# Patient Record
Sex: MALE | Race: WHITE | Employment: OTHER | URBAN - METROPOLITAN AREA
[De-identification: names, ages, dates, MRNs, and addresses within clinical notes are randomized per-mention and may not be internally consistent; named-entity substitution may affect disease eponyms.]

---

## 2019-04-24 ENCOUNTER — OFFICE VISIT (OUTPATIENT)
Dept: ORTHOPEDIC SURGERY | Age: 44
End: 2019-04-24
Payer: COMMERCIAL

## 2019-04-24 VITALS — BODY MASS INDEX: 25.05 KG/M2 | WEIGHT: 175 LBS | HEIGHT: 70 IN

## 2019-04-24 DIAGNOSIS — M25.561 RIGHT KNEE PAIN, UNSPECIFIED CHRONICITY: Primary | ICD-10-CM

## 2019-04-24 PROCEDURE — 99203 OFFICE O/P NEW LOW 30 MIN: CPT | Performed by: ORTHOPAEDIC SURGERY

## 2019-04-24 NOTE — PROGRESS NOTES
MD Matty Bradley, [place]         Orthopaedic Surgery and Sports Medicine      Patient Name: Caridad Dias  YOB: 1975  Date of Encounter: 4/24/2019  Patient's PCP is Marquez Moore MD    SUBJECTIVE  Chief Complaint:  Knee Pain (RIGHT )      History of Present Illness:  Caridad Dias is a 41 y.o. male here regarding right knee pain. 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 The patient is currently ambulating without difficulty. .      The pain began several weeks ago. . There was not a history of injury. History of swelling: No  History of \"giving way\": No  History of instability: No  History of popping and/or catching: No    The pain is located medial.   The patient describes the symptoms as aching and sharp. He rates pain at 3/10. Symptoms improve with rest.   The symptoms are made worse with activity, stair climbing, kneeling. Sleep pattern is affected by the chief complaint: No    The patient has not had PT. The patient has not had an injection. The patient has not taken NSAIDs. The patient is working.       Pain Assessment:  Pain Assessment  Location of Pain: Knee  Location Modifiers: Right  Severity of Pain: 5  Quality of Pain: Dull, Aching  Duration of Pain: Persistent  Frequency of Pain: Intermittent  Aggravating Factors: Stairs, Walking, Standing, Squatting, Kneeling  Relieving Factors: Rest  Result of Injury: No  Work-Related Injury: No  Are there other pain locations you wish to document?: No    Past Medical History:  Past Medical History:   Diagnosis Date    Gastric ulcer        Past Surgical History:  Past Surgical History:   Procedure Laterality Date    ABDOMEN SURGERY      secondary to gastric ulcer       Allergies:  No Known Allergies    Medications:  Current Outpatient Medications   Medication Sig Dispense Refill    Omeprazole Magnesium (PRILOSEC OTC PO) Take  by mouth.  loratadine (CLARITIN) 10 MG tablet Take 10 mg by mouth daily. No current facility-administered medications for this visit. Review of Systems:  Shabnam Meza review of systems has been performed by intake and observation. All past and current ROS forms have been scanned into the medical record. She has been instructed to contact her primary care provider regarding ROS issues if not already being addressed at this time. There are no recent changes. The most recent ROS was scanned into media on 4/24/2019       OBJECTIVE  PHYSICAL EXAM  Vital Signs: There were no vitals filed for this visit. Body mass index is 25.11 kg/m². General Exam:   Constitutional: Patient is adequately groomed with no evidence of malnutrition  Mental Status: The patient is oriented to time, place and person. The patient's mood and affect are appropriate. Lymphatic: The lymphatic examination bilaterally reveals all areas to be without enlargement or induration. Vascular: Examination reveals no swelling or calf tenderness. Peripheral pulses are palpable and 2+. Neurological: The patient has good coordination. There is no weakness or sensory deficit. Right Knee Examination  Inspection:   Knee alignment: neutral  no swelling noted. No erythema or ecchymosis. Skin is intact with no cellulitis, rashes, ulcerations, lymphedema or cutaneous lesions noted. Gait: normal. The patient can bear weight on the extremity. Palpation: mild tenderness to palpation on the medial joint line. no effusion noted.     Range of Motion:  normal    Special Tests:  Lachman test: negative       Anterior drawer: negative       Posterior drawer: negative       Brayden's test: negative       Varus laxity at 30 degrees: negative       Valgus laxity at 30 degrees: negative    Strength: no gross motor weakness noted. Motor exam of the lower extremities show quadriceps, hamstrings, foot dorsiflexion and plantarflexion grossly intact. Neurologic & vascular: Sensation to both feet is grossly intact to light touch. The bilateral lower extremities are warm and well-perfused with brisk capillary refill. Additional Examinations:  Left Lower Extremity: Examination of the left lower extremity does not show any tenderness, deformity or injury. Range of motion is unremarkable. There is no gross instability. There are no rashes, ulcerations or lesions. Strength and tone are normal.      DIAGNOSTICS  Xrays obtained in office today: Yes  Xrays reviewed today: Yes  4 views of the right knee show   Fracture: No  Dislocation: No  Knee joint arthritis: none  Medial compartment: none  Lateral compartment: none  Patellofemoral compartment: none  Patellar tilt:No  Varus deformity: No  Valgus deformity:No           ASSESSMENT (Medical Decision Making)    Efraín Aguilera is a 41 y.o. male with the following diagnosis: right knee probable overuse symptoms. ICD-10-CM    1. Right knee pain, unspecified chronicity M25.561 XR KNEE RIGHT (MIN 4 VIEWS)       His overall course is unchanged despite conservative treatment      PLAN (Medical Decision Making)  Office Procedures:  Orders Placed This Encounter   Procedures    XR KNEE RIGHT (MIN 4 VIEWS)     Standing Status:   Future     Number of Occurrences:   1     Standing Expiration Date:   4/24/2020       Treatment Plan:    I discussed the diagnosis and treatment options with Efraín Aguilera today. Long discussion today about his progression of activity. It's probably a knee pain related to this new onset activity in a relatively rapid progression. He will continue varus knee sleeve when he runs. He'll continue to be very cautious with his shoewear. He is instructed to start oral anti-inflammatories over-the-counter such as Aleve.   Was informed that he must take that medication with food. I think it is okay for him to continue to run as long as his symptoms are minimal.  Patient was asked to follow-up in p.r.n. Non-steroidal anti-inflammatories medications (NSAIDs) can be used to assist with pain control and to reduce inflammatory changes. These medications may be over-the-counter or prescribed. We discussed taking the NSAID properly and the precautions. The patient understands that this medication may potentially interfere with other medications. Patient was also instructed to immediately discontinue the medication is there is any possible complication. Eben Velarde was instructed to call the office if his symptoms worsen or if new symptoms appear prior to the next scheduled visit. He is specifically instructed to contact the office between now and schedule appointment if he has concerns related to his condition or if he needs assistance in scheduling any above tests. He is welcome to call for an appointment sooner if he has any additional concerns or questions. This dictation was performed with a verbal recognition program (DRAGON) and it was checked for errors. It is possible that there are still dictated errors within this office note. If so, please bring any errors to my attention for an addendum. All efforts were made to ensure that this office note is accurate.

## 2021-06-06 ENCOUNTER — HOSPITAL ENCOUNTER (EMERGENCY)
Age: 46
Discharge: HOME | End: 2021-06-06
Payer: COMMERCIAL

## 2021-06-06 ENCOUNTER — APPOINTMENT (OUTPATIENT)
Dept: GENERAL RADIOLOGY | Age: 46
End: 2021-06-06
Payer: COMMERCIAL

## 2021-06-06 VITALS
SYSTOLIC BLOOD PRESSURE: 142 MMHG | RESPIRATION RATE: 16 BRPM | WEIGHT: 199 LBS | TEMPERATURE: 98.4 F | HEART RATE: 81 BPM | HEIGHT: 71 IN | OXYGEN SATURATION: 99 % | BODY MASS INDEX: 27.86 KG/M2 | DIASTOLIC BLOOD PRESSURE: 93 MMHG

## 2021-06-06 DIAGNOSIS — S93.402A SPRAIN OF LEFT ANKLE, UNSPECIFIED LIGAMENT, INITIAL ENCOUNTER: Primary | ICD-10-CM

## 2021-06-06 PROCEDURE — 99284 EMERGENCY DEPT VISIT MOD MDM: CPT

## 2021-06-06 PROCEDURE — 73610 X-RAY EXAM OF ANKLE: CPT

## 2021-06-06 PROCEDURE — 6370000000 HC RX 637 (ALT 250 FOR IP): Performed by: PHYSICIAN ASSISTANT

## 2021-06-06 PROCEDURE — 73590 X-RAY EXAM OF LOWER LEG: CPT

## 2021-06-06 RX ORDER — HYDROCODONE BITARTRATE AND ACETAMINOPHEN 5; 325 MG/1; MG/1
1 TABLET ORAL EVERY 6 HOURS PRN
Qty: 8 TABLET | Refills: 0 | Status: SHIPPED | OUTPATIENT
Start: 2021-06-06 | End: 2021-06-09

## 2021-06-06 RX ORDER — ESCITALOPRAM OXALATE 5 MG/1
5 TABLET ORAL DAILY
COMMUNITY

## 2021-06-06 RX ORDER — HYDROCODONE BITARTRATE AND ACETAMINOPHEN 5; 325 MG/1; MG/1
1 TABLET ORAL ONCE
Status: COMPLETED | OUTPATIENT
Start: 2021-06-06 | End: 2021-06-06

## 2021-06-06 RX ORDER — PANTOPRAZOLE SODIUM 40 MG/1
40 TABLET, DELAYED RELEASE ORAL DAILY
COMMUNITY

## 2021-06-06 RX ADMIN — HYDROCODONE BITARTRATE AND ACETAMINOPHEN 1 TABLET: 5; 325 TABLET ORAL at 19:36

## 2021-06-06 ASSESSMENT — PAIN SCALES - GENERAL
PAINLEVEL_OUTOF10: 7
PAINLEVEL_OUTOF10: 10
PAINLEVEL_OUTOF10: 9

## 2021-06-06 NOTE — ED NOTES
Left ankle pain injury. Pt stastes \"I stepped in a mole hole yesterday in my yard twisted my ankle,I was seen at [facility] this morning they did an xray said it was negative they placed my ankle in a air cast for support/I already have crutches to use also took some Ibuprofen earlier but it's just not helping with the pain and swelling\". Pt pedal pulse palpable.      Callie Salgado LPN  69/13/24 [id number]

## 2021-06-07 NOTE — ED NOTES
Pt scripts x1 instructed to follow up with [facility]. Assessed per Xu GARCIA.      Mariela Durand, ABDIRAHMAN  19/80/87 [id number]

## 2021-06-07 NOTE — ED PROVIDER NOTES
Not on file   Occupational History    Not on file   Tobacco Use    Smoking status: Passive Smoke Exposure - Never Smoker   Substance and Sexual Activity    Alcohol use: Yes     Comment: socially    Drug use: Not on file    Sexual activity: Not on file   Other Topics Concern    Not on file   Social History Narrative    Not on file     Social Determinants of Health     Financial Resource Strain:     Difficulty of Paying Living Expenses:    Food Insecurity:     Worried About Running Out of Food in the Last Year:     [address] in the Last Year:    Transportation Needs:     Lack of Transportation (Medical):  Lack of Transportation (Non-Medical):    Physical Activity:     Days of Exercise per Week:     Minutes of Exercise per Session:    Stress:     Feeling of Stress :    Social Connections:     Frequency of Communication with Friends and Family:     Frequency of Social Gatherings with Friends and Family:     Attends Oriental orthodox Services:     Active Member of Clubs or Organizations:     Attends Club or Organization Meetings:     Marital Status:    Intimate Partner Violence:     Fear of Current or Ex-Partner:     Emotionally Abused:     Physically Abused:     Sexually Abused:      No current facility-administered medications for this encounter. Current Outpatient Medications   Medication Sig Dispense Refill    escitalopram (LEXAPRO) 5 MG tablet Take 5 mg by mouth daily Pt unaware of correct dosage      pantoprazole (PROTONIX) 40 MG tablet Take 40 mg by mouth daily      HYDROcodone-acetaminophen (NORCO) 5-325 MG per tablet Take 1 tablet by mouth every 6 hours as needed for Pain for up to 3 days. Intended supply: 3 days. Take lowest dose possible to manage pain 8 tablet 0    loratadine (CLARITIN) 10 MG tablet Take 10 mg by mouth daily. No Known Allergies    REVIEW OF SYSTEMS:  6 systems reviewed, pertinent positives per HPI otherwise noted to be negative.     PHYSICAL EXAM:  BP [employer] ) 142/93   Pulse 81   Temp 98.4 °F (36.9 °C) (Oral)   Resp 16   Ht 5' 11\" (1.803 m)   Wt 199 lb (90.3 kg)   SpO2 99%   BMI 27.75 kg/m²   CONSTITUTIONAL: Awake and alert. Well-developed. Well-nourished. Non-toxic. Cooperative. No acute distress. HENT: Normocephalic. Atraumatic. External ears normal, without discharge. Nose normal. Mucous membranes moist.  EYES: Conjunctiva non-injected. No scleral icterus. PERRL. EOM's grossly intact. NECK: Supple. Normal ROM. CARDIOVASCULAR: Normal heart rate. Intact distal pulses. PULMONARY/CHEST WALL: Breathing is unlabored. Equal, symmetric chest rise. Speaking comfortably in full sentences. ABDOMEN: Nondistended  MUSKULOSKELETAL: Left lower extremity: No acute deformities. Mild swelling to the lateral malleolus. Tenderness to the lateral malleolus and mild tenderness to the proximal fibula. No crepitus. Good ROM. No pain or defect over the Achilles tendon. SKIN: Warm and dry. NEUROLOGICAL: Alert and oriented x 3. Strength is 5/5 in all extremities and sensation is intact. PSYCHIATRIC: Normal affect    Labs:    None    RADIOLOGY:    All x-ray studies are viewed/reviewed by me. Formal interpretations per the radiologist are as follows:    XR TIBIA FIBULA LEFT (2 VIEWS)    Result Date: 6/6/2021  EXAMINATION: 2 XRAY VIEWS OF THE LEFT TIBIA AND FIBULA 6/6/2021 7:41 pm COMPARISON: Left ankle x-ray same day. HISTORY: ORDERING SYSTEM PROVIDED HISTORY: Describes twisting left ankle. Pain to palpate proximal fibula TECHNOLOGIST PROVIDED HISTORY: Reason for exam:->Describes twisting left ankle. Pain to palpate proximal fibula Reason for Exam: left lower leg pain, ankle xray negative Acuity: Acute Type of Exam: Initial FINDINGS: Distal tibia and fibula at the ankle are better evaluated on same-day ankle x-ray. No acute fracture in the remainder of the fibula and tibia. Suboptimal evaluation for dislocation at the knee due to projection. Ankle effusion noted. Distal tibia and fibula at the ankle are better evaluated on same-day ankle x-ray. No acute fracture in the remainder of the fibula and tibia. Nonspecific ankle effusion. Correlate with presentation. XR ANKLE LEFT (MIN 3 VIEWS)    Result Date: 6/6/2021  EXAMINATION: THREE XRAY VIEWS OF THE LEFT ANKLE 6/6/2021 7:05 pm COMPARISON: None. HISTORY: ORDERING SYSTEM PROVIDED HISTORY: injury TECHNOLOGIST PROVIDED HISTORY: Reason for exam:->injury FINDINGS: The soft tissues are unremarkable. There is no fracture or dislocation. No focal destructive osseous lesion. No radiopaque foreign body is identified. There is a small plantar calcaneal spur. No acute abnormality           ED COURSE/MDM:  Patient was given the following medications:  Medications   HYDROcodone-acetaminophen (NORCO) 5-325 MG per tablet 1 tablet (1 tablet Oral Given 6/6/21 1936)       I have evaluated this patient here in the ED. Patient describes an inversion injury to his left ankle yesterday. He has had pain since then. He describes constant throbbing. Though he had an outpatient x-ray at urgent care before coming here he is here because of continued pain. An x-ray of the left ankle joint shows no acute abnormality. Further x-rays of the left tib-fib are done secondary to some mild proximal fibula pain. This is negative for fracture. There is a nonspecific ankle effusion which correlates with his acute ankle sprain. Again I recommended ice, elevate, use of crutches. He can continue wearing the air splint as provided by urgent care. In addition to ibuprofen regularly I am going to write a very short course of Norco and have him follow-up with orthopedics due to his inability to tolerate injury with conservative measures at this time. Patient was given scripts for the following medications. I counseled patient how to take these medications.    New Prescriptions    HYDROCODONE-ACETAMINOPHEN (NORCO) 5-325 MG PER TABLET    Take 1

## 2023-03-14 ENCOUNTER — OFFICE VISIT (OUTPATIENT)
Dept: ENT CLINIC | Age: 48
End: 2023-03-14
Payer: COMMERCIAL

## 2023-03-14 VITALS — WEIGHT: 190 LBS | TEMPERATURE: 97.2 F | BODY MASS INDEX: 26.6 KG/M2 | HEIGHT: 71 IN | RESPIRATION RATE: 16 BRPM

## 2023-03-14 DIAGNOSIS — J34.2 DEVIATED NASAL SEPTUM: Primary | ICD-10-CM

## 2023-03-14 DIAGNOSIS — J30.2 SEASONAL ALLERGIES: ICD-10-CM

## 2023-03-14 PROCEDURE — 99204 OFFICE O/P NEW MOD 45 MIN: CPT | Performed by: STUDENT IN AN ORGANIZED HEALTH CARE EDUCATION/TRAINING PROGRAM

## 2023-03-14 ASSESSMENT — ENCOUNTER SYMPTOMS
COUGH: 0
NAUSEA: 0
EYE PAIN: 0
RHINORRHEA: 0
SHORTNESS OF BREATH: 0
VOMITING: 0

## 2023-03-14 NOTE — PROGRESS NOTES
[facility]      Patient Name: Abelardo Raymundo  Medical Record Number:  [id number]  Primary Care Physician:  Priscilla Tobias PA-C  Date of Consultation: 3/14/2023    Chief Complaint:   Chief Complaint   Patient presents with    New Patient     Patient states he thinks he has a deviated septum. Was referred by allergist      HISTORY OF PRESENT ILLNESS  Tess Bess is a(n) 41 y.o. male who presents for evaluation of nasal congestion. He states is been going on for many years. He believes he is not able to breathe well out of his nose about 70 to 80% of the time. He is currently on allergy shots and has not maintenance therapy. He has tried Flonase and is currently on Flonase. He still continues to have difficulty breathing the older he gets. Is not had any nose or sinus surgeries. He does not have any history of head and neck imaging. There is no problem list on file for this patient. Past Surgical History:   Procedure Laterality Date    ABDOMEN SURGERY      secondary to gastric ulcer    TONSILLECTOMY       No family history on file.   Social History     Socioeconomic History    Marital status:      Spouse name: Not on file    Number of children: Not on file    Years of education: Not on file    Highest education level: Not on file   Occupational History    Not on file   Tobacco Use    Smoking status: Never     Passive exposure: Yes    Smokeless tobacco: Not on file   Vaping Use    Vaping Use: Never used   Substance and Sexual Activity    Alcohol use: Not Currently     Comment: socially    Drug use: Never    Sexual activity: Not on file   Other Topics Concern    Not on file   Social History Narrative    Not on file     Social Determinants of Health     Financial Resource Strain: Not on file   Food Insecurity: Not on file   Transportation Needs: Not on file   Physical Activity: Not on file   Stress: Not on file   Social Connections: Not on file   Intimate Partner Violence: Not on file   Housing Stability: Not on file       DRUG/FOOD ALLERGIES: Patient has no known allergies. CURRENT MEDICATIONS  Prior to Admission medications    Medication Sig Start Date End Date Taking? Authorizing Provider   escitalopram (LEXAPRO) 5 MG tablet Take 5 mg by mouth daily Pt unaware of correct dosage   Yes Historical Provider, MD   pantoprazole (PROTONIX) 40 MG tablet Take 40 mg by mouth daily   Yes Historical Provider, MD   loratadine (CLARITIN) 10 MG tablet Take 10 mg by mouth daily. Yes Historical Provider, MD     REVIEW OF SYSTEMS  The following systems were reviewed and revealed the following in addition to any already discussed in the HPI:    Review of Systems   Constitutional:  Negative for fatigue and fever. HENT:  Positive for congestion. Negative for ear pain, postnasal drip, rhinorrhea and sneezing. Eyes:  Negative for pain and visual disturbance. Respiratory:  Negative for cough and shortness of breath. Cardiovascular:  Negative for chest pain. Gastrointestinal:  Negative for nausea and vomiting. Endocrine: Negative. Genitourinary: Negative. Musculoskeletal:  Negative for neck pain and neck stiffness. Skin:  Negative for rash. Neurological:  Negative for dizziness and headaches.       PHYSICAL EXAM  Temp 97.2 °F (36.2 °C) (Infrared)   Resp 16   Ht 5' 11\" (1.803 m)   Wt 190 lb (86.2 kg)   BMI 26.50 kg/m²     GENERAL: No Acute Distress, Alert and Oriented, no hoarseness  EYES: EOMI, Anti-icteric  NOSE: No epistaxis, nasal mucosa within normal limits, no purulent drainage, S-shaped deviated septum to the left anteriorly to the right posteriorly with contact inferior turbinates bilaterally  EARS: Normal external canal appearance, EAC patent bilaterally, TMs intact bilaterally no evidence of effusions  FACE: 1/6 House-Brackmann Scale, symmetric, sensation equal bilaterally  ORAL CAVITY: No masses or lesions palpated, uvula is midline, moist mucous membranes,   NECK: Normal range of motion, no thyromegaly, trachea is midline, no lymphadenopathy, no neck masses, no crepitus  CHEST: Normal respiratory effort, no retractions, breathing comfortably  SKIN: No rashes, normal appearing skin, no evidence of skin lesions/tumors    RADIOLOGY  Summary of findings:    PROCEDURE    ASSESSMENT/PLAN  Robyn Shields is a very pleasant 41 y.o. male with     1. Deviated nasal septum  Robyn Shields has a S-shaped deviated septum and has failed Flonase and is currently on allergy shots. He is a candidate for septoplasty with inferior turbinate reduction. Risk benefits alternatives surgery were discussed with patient. Risk include but not limited to postoperative hemorrhage, septal perforation, infection, and anesthesia risk of general anesthesia. He is amenable to these risks of surgery and we will schedule surgery at his earliest convenience. Postoperative instructions were placed in the patient's chart for education purposes    2. Seasonal allergies  He should continue using the Flonase and allergy shots    Follow-up 1 week after surgery    Medical Decision Making:   The following items were considered in medical decision making:  Independent review of images  Review / order clinical lab tests  Review / order radiology tests  Decision to obtain old records

## 2023-03-14 NOTE — PATIENT INSTRUCTIONS
SEPTOPLASTY / [facility] Operative Instructions   [facility] Number (24 hours): [phone]    The Surgery Itself   Septoplasty and turbinate reduction involves general anesthesia, typically for less than one hour. Patients may be sedated for several hours after surgery and may remain sleepy for the better part of the day. Nausea and vomiting is occasionally seen, and usually resolves by the evening of surgery - even without additional medications. Almost all patients can go home the day of surgery. After Surgery  You may have plastic splints and/or gauze in your nose following surgery; this will make breathing through your nose difficult. A humidifier or vaporizer can be used in the bedroom to prevent throat pain with mouth breathing. Bloody nasal drainage is normal after this surgery for 5-7 days, usually decreasing in volume with each day that passes. Drainage will flow from the front of the nose and down the back of the throat. Make sure you spit out blood drainage that drips down the back of your throat to prevent nausea/vomiting. You will have a nasal drip pad/sling with gauze to catch drainage from the front of your nose. The dressing may need to be changed frequently during the first 24 hours following surgery. In case of profuse nasal bleeding, you may apply ice to the bridge of the nose and pinch the nose just above the tip and hold for 10 minutes; if profuse bleeding continues, contact the doctor's office. You may notice facial pressure and fullness similar to a mild sinus infection. This is more common if splints or gauze are in place. Frequent hot showers, breathing in steam from a pot of boiling water, or simply sniffing a small amount of water through your nose will help break up congestion and clear any clot or mucus that builds up within the nose after surgery. Do not pull at the splints, gauze or sutures in your nose after surgery.     It is more comfortable to sleep with extra pillows or in a recliner for the first few days after surgery until the drainage begins to resolve. Do not blow your nose for 2 weeks after surgery. Avoid lifting > 10 lbs. and no vigorous exercise for 2 weeks after surgery. Sense of smell and taste are often diminished for several weeks after surgery. There may be some tenderness or numbness in your upper front teeth, which is normal after surgery. You may express old clot, discolored mucus or very large nasal crusts from your nose for up to 3-4 weeks after surgery; depending on how frequently and how effectively you irrigate your nose with the saltwater spray. Medications   Pain medication should be used for pain as prescribed. Pain and pressure in the nose is expected after surgery - the nasal splints cause most of this. As the surgical site heals, pain will resolve over the course of a week. Pain medications can cause nausea, which can be prevented if you take them with food or milk. You can purchase 2 nasal sprays for use after surgery:   Afrin can be used up to 2 times a day (best before bed) to reduce bloody drainage from the nose for the first few days after surgery. DO NOT USE FOR MORE THAN 3 DAYS  Saline/salt water spray can be used once the nasal splints/gauze are removed to prevent crusting inside of the nose. Take all of your routine medications as prescribed, unless told otherwise by your doctor. Any medications that thin the blood should be avoided.  (Examples: Motrin, Aspirin, Advil)

## 2023-03-14 NOTE — LETTER
[facility]    Surgery Schedule Request Form: 03/16/23  [address], [place]  DATE OF SURGERY: 3/29/23  TIME OF SURGERY:  10:00            CONF #: ____________________   Patient Information:  Patient name: Marlon Ortiz    YOB: 1975 Age/Sex:47 y.o./male    SS #:[id number]    Wt Readings from Last 1 Encounters:   03/14/23 190 lb (86.2 kg)       Telephone Information:   Mobile [phone]     Home [phone]     Surgeon & Procedure Information:   Lead surgeon: Chilango Monreal Co-Surgeon: chance  Phone: [phone] Fax: [phone]  PCP: Dejan Roman PA-C    Diagnosis: deviated nasal septum, J34.2   inferior turbinate hypertrophy  J30.2      Location: Any    Procedure name/CPT: Bilateral inferior turbinate reduction 00822-68 and Septoplasty [id number]    Procedure length: 1 hour Anesthesia: General    Special Equipment: no    Patient Status: SDS (OP)    COVID Vacs: yes / no     Primary Payor Plan: BC/BS  Member ID: [id number]   [address] [place] [address] Drive name: Elva Roth    [] Implement attached clinical orders for patient.       Electronically signed by Jose Lim DO on 3/16/2023 at 9:26 AM

## 2023-03-16 ENCOUNTER — TELEPHONE (OUTPATIENT)
Dept: ENT CLINIC | Age: 48
End: 2023-03-16

## 2023-03-21 ENCOUNTER — OFFICE VISIT (OUTPATIENT)
Dept: URGENT CARE | Age: 48
End: 2023-03-21

## 2023-03-21 VITALS
HEIGHT: 71 IN | RESPIRATION RATE: 16 BRPM | BODY MASS INDEX: 27.44 KG/M2 | DIASTOLIC BLOOD PRESSURE: 64 MMHG | SYSTOLIC BLOOD PRESSURE: 118 MMHG | WEIGHT: 196 LBS | OXYGEN SATURATION: 96 % | HEART RATE: 89 BPM | TEMPERATURE: 97.8 F

## 2023-03-21 DIAGNOSIS — Z01.818 PRE-OPERATIVE GENERAL PHYSICAL EXAMINATION: Primary | ICD-10-CM

## 2023-03-21 ASSESSMENT — ENCOUNTER SYMPTOMS
EYES NEGATIVE: 1
GASTROINTESTINAL NEGATIVE: 1
RESPIRATORY NEGATIVE: 1

## 2023-03-21 NOTE — PROGRESS NOTES
Heather Shukla (:  1975) is a 41 y.o. male,New patient, here for evaluation of the following chief complaint(s):  Pre-op Exam (3/29/2023 Dr. Glenys Link, [facility] Septoplasty)      ASSESSMENT/PLAN:    ICD-10-CM    1. Pre-operative general physical examination  Z01.818           Surgical documents completed and faxed and scanned into chart. Patient cleared for surgery as scheduled. SUBJECTIVE/OBJECTIVE:  41year old male presents with request for pre - op physical for septoplasty /turbinate reduction scheduled on 3/19/23 with Dr Sylvester Martin. He has h/o surgical procedures without complication of anesthesia. States that he feels well today. Denies heart, lung or kidney diseases. History provided by:  Patient   used: No      Vitals:    23 1334   BP: 118/64   Site: Left Upper Arm   Position: Sitting   Cuff Size: Medium Adult   Pulse: 89   Resp: 16   Temp: 97.8 °F (36.6 °C)   TempSrc: Oral   SpO2: 96%   Weight: 196 lb (88.9 kg)   Height: 5' 11\" (1.803 m)       Review of Systems   Constitutional: Negative. HENT:  Positive for congestion. Has chronic sinus congestion   Eyes: Negative. Respiratory: Negative. Cardiovascular: Negative. Gastrointestinal: Negative. Endocrine: Negative. Genitourinary: Negative. Musculoskeletal: Negative. Skin: Negative. Allergic/Immunologic: Positive for environmental allergies. Neurological: Negative. Hematological: Negative. Psychiatric/Behavioral: Negative. Physical Exam  Vitals reviewed. Constitutional:       General: He is not in acute distress. Appearance: Normal appearance. He is not ill-appearing. HENT:      Head: Normocephalic. Right Ear: Tympanic membrane, ear canal and external ear normal. There is no impacted cerumen. Left Ear: Tympanic membrane, ear canal and external ear normal. There is no impacted cerumen. Nose: Septal deviation and congestion present.       Right

## 2023-03-28 ENCOUNTER — TELEPHONE (OUTPATIENT)
Dept: ENT CLINIC | Age: 48
End: 2023-03-28

## 2023-03-28 ENCOUNTER — ANESTHESIA EVENT (OUTPATIENT)
Dept: OPERATING ROOM | Age: 48
End: 2023-03-28
Payer: COMMERCIAL

## 2023-03-29 ENCOUNTER — ANESTHESIA (OUTPATIENT)
Dept: OPERATING ROOM | Age: 48
End: 2023-03-29
Payer: COMMERCIAL

## 2023-03-29 ENCOUNTER — HOSPITAL ENCOUNTER (OUTPATIENT)
Age: 48
Setting detail: OUTPATIENT SURGERY
Discharge: HOME | End: 2023-03-29
Attending: STUDENT IN AN ORGANIZED HEALTH CARE EDUCATION/TRAINING PROGRAM | Admitting: STUDENT IN AN ORGANIZED HEALTH CARE EDUCATION/TRAINING PROGRAM
Payer: COMMERCIAL

## 2023-03-29 VITALS
BODY MASS INDEX: 27.51 KG/M2 | DIASTOLIC BLOOD PRESSURE: 71 MMHG | OXYGEN SATURATION: 95 % | SYSTOLIC BLOOD PRESSURE: 102 MMHG | TEMPERATURE: 97.5 F | RESPIRATION RATE: 16 BRPM | WEIGHT: 196.5 LBS | HEART RATE: 57 BPM | HEIGHT: 71 IN

## 2023-03-29 DIAGNOSIS — J34.2 DEVIATED NASAL SEPTUM: ICD-10-CM

## 2023-03-29 DIAGNOSIS — Z98.890 STATUS POST SURGERY: Primary | ICD-10-CM

## 2023-03-29 PROCEDURE — 2500000003 HC RX 250 WO HCPCS: Performed by: NURSE ANESTHETIST, CERTIFIED REGISTERED

## 2023-03-29 PROCEDURE — 2720000010 HC SURG SUPPLY STERILE: Performed by: STUDENT IN AN ORGANIZED HEALTH CARE EDUCATION/TRAINING PROGRAM

## 2023-03-29 PROCEDURE — 2580000003 HC RX 258: Performed by: NURSE ANESTHETIST, CERTIFIED REGISTERED

## 2023-03-29 PROCEDURE — 2580000003 HC RX 258: Performed by: STUDENT IN AN ORGANIZED HEALTH CARE EDUCATION/TRAINING PROGRAM

## 2023-03-29 PROCEDURE — 30520 REPAIR OF NASAL SEPTUM: CPT | Performed by: STUDENT IN AN ORGANIZED HEALTH CARE EDUCATION/TRAINING PROGRAM

## 2023-03-29 PROCEDURE — 3700000001 HC ADD 15 MINUTES (ANESTHESIA): Performed by: STUDENT IN AN ORGANIZED HEALTH CARE EDUCATION/TRAINING PROGRAM

## 2023-03-29 PROCEDURE — 6360000002 HC RX W HCPCS: Performed by: NURSE ANESTHETIST, CERTIFIED REGISTERED

## 2023-03-29 PROCEDURE — 7100000001 HC PACU RECOVERY - ADDTL 15 MIN: Performed by: STUDENT IN AN ORGANIZED HEALTH CARE EDUCATION/TRAINING PROGRAM

## 2023-03-29 PROCEDURE — A4217 STERILE WATER/SALINE, 500 ML: HCPCS | Performed by: STUDENT IN AN ORGANIZED HEALTH CARE EDUCATION/TRAINING PROGRAM

## 2023-03-29 PROCEDURE — 3600000004 HC SURGERY LEVEL 4 BASE: Performed by: STUDENT IN AN ORGANIZED HEALTH CARE EDUCATION/TRAINING PROGRAM

## 2023-03-29 PROCEDURE — 3700000000 HC ANESTHESIA ATTENDED CARE: Performed by: STUDENT IN AN ORGANIZED HEALTH CARE EDUCATION/TRAINING PROGRAM

## 2023-03-29 PROCEDURE — 7100000000 HC PACU RECOVERY - FIRST 15 MIN: Performed by: STUDENT IN AN ORGANIZED HEALTH CARE EDUCATION/TRAINING PROGRAM

## 2023-03-29 PROCEDURE — 2709999900 HC NON-CHARGEABLE SUPPLY: Performed by: STUDENT IN AN ORGANIZED HEALTH CARE EDUCATION/TRAINING PROGRAM

## 2023-03-29 PROCEDURE — 3600000014 HC SURGERY LEVEL 4 ADDTL 15MIN: Performed by: STUDENT IN AN ORGANIZED HEALTH CARE EDUCATION/TRAINING PROGRAM

## 2023-03-29 PROCEDURE — 7100000010 HC PHASE II RECOVERY - FIRST 15 MIN: Performed by: STUDENT IN AN ORGANIZED HEALTH CARE EDUCATION/TRAINING PROGRAM

## 2023-03-29 PROCEDURE — 2500000003 HC RX 250 WO HCPCS: Performed by: STUDENT IN AN ORGANIZED HEALTH CARE EDUCATION/TRAINING PROGRAM

## 2023-03-29 PROCEDURE — 6370000000 HC RX 637 (ALT 250 FOR IP): Performed by: STUDENT IN AN ORGANIZED HEALTH CARE EDUCATION/TRAINING PROGRAM

## 2023-03-29 PROCEDURE — 30140 RESECT INFERIOR TURBINATE: CPT | Performed by: STUDENT IN AN ORGANIZED HEALTH CARE EDUCATION/TRAINING PROGRAM

## 2023-03-29 PROCEDURE — 6360000002 HC RX W HCPCS: Performed by: ANESTHESIOLOGY

## 2023-03-29 PROCEDURE — 7100000011 HC PHASE II RECOVERY - ADDTL 15 MIN: Performed by: STUDENT IN AN ORGANIZED HEALTH CARE EDUCATION/TRAINING PROGRAM

## 2023-03-29 RX ORDER — OXYMETAZOLINE HYDROCHLORIDE 0.05 G/100ML
SPRAY NASAL PRN
Status: DISCONTINUED | OUTPATIENT
Start: 2023-03-29 | End: 2023-03-29 | Stop reason: ALTCHOICE

## 2023-03-29 RX ORDER — OXYCODONE HYDROCHLORIDE 5 MG/1
5 TABLET ORAL EVERY 30 MIN PRN
Status: DISCONTINUED | OUTPATIENT
Start: 2023-03-29 | End: 2023-03-29 | Stop reason: HOSPADM

## 2023-03-29 RX ORDER — DEXAMETHASONE SODIUM PHOSPHATE 4 MG/ML
INJECTION, SOLUTION INTRA-ARTICULAR; INTRALESIONAL; INTRAMUSCULAR; INTRAVENOUS; SOFT TISSUE PRN
Status: DISCONTINUED | OUTPATIENT
Start: 2023-03-29 | End: 2023-03-29 | Stop reason: SDUPTHER

## 2023-03-29 RX ORDER — DOXYCYCLINE HYCLATE 100 MG
100 TABLET ORAL 2 TIMES DAILY
Qty: 14 TABLET | Refills: 0 | Status: SHIPPED | OUTPATIENT
Start: 2023-03-29 | End: 2023-04-05

## 2023-03-29 RX ORDER — SODIUM CHLORIDE 9 MG/ML
INJECTION, SOLUTION INTRAVENOUS PRN
Status: DISCONTINUED | OUTPATIENT
Start: 2023-03-29 | End: 2023-03-29 | Stop reason: HOSPADM

## 2023-03-29 RX ORDER — ONDANSETRON 2 MG/ML
4 INJECTION INTRAMUSCULAR; INTRAVENOUS
Status: DISCONTINUED | OUTPATIENT
Start: 2023-03-29 | End: 2023-03-29 | Stop reason: HOSPADM

## 2023-03-29 RX ORDER — PHENYLEPHRINE HCL IN 0.9% NACL 1 MG/10 ML
SYRINGE (ML) INTRAVENOUS PRN
Status: DISCONTINUED | OUTPATIENT
Start: 2023-03-29 | End: 2023-03-29 | Stop reason: SDUPTHER

## 2023-03-29 RX ORDER — ROCURONIUM BROMIDE 10 MG/ML
INJECTION, SOLUTION INTRAVENOUS PRN
Status: DISCONTINUED | OUTPATIENT
Start: 2023-03-29 | End: 2023-03-29 | Stop reason: SDUPTHER

## 2023-03-29 RX ORDER — SODIUM CHLORIDE 0.9 % (FLUSH) 0.9 %
5-40 SYRINGE (ML) INJECTION EVERY 12 HOURS SCHEDULED
Status: DISCONTINUED | OUTPATIENT
Start: 2023-03-29 | End: 2023-03-29 | Stop reason: HOSPADM

## 2023-03-29 RX ORDER — MIDAZOLAM HYDROCHLORIDE 1 MG/ML
INJECTION INTRAMUSCULAR; INTRAVENOUS PRN
Status: DISCONTINUED | OUTPATIENT
Start: 2023-03-29 | End: 2023-03-29 | Stop reason: SDUPTHER

## 2023-03-29 RX ORDER — LIDOCAINE HYDROCHLORIDE AND EPINEPHRINE 10; 10 MG/ML; UG/ML
INJECTION, SOLUTION INFILTRATION; PERINEURAL PRN
Status: DISCONTINUED | OUTPATIENT
Start: 2023-03-29 | End: 2023-03-29 | Stop reason: ALTCHOICE

## 2023-03-29 RX ORDER — OXYCODONE HYDROCHLORIDE AND ACETAMINOPHEN 5; 325 MG/1; MG/1
1 TABLET ORAL EVERY 6 HOURS PRN
Qty: 12 TABLET | Refills: 0 | Status: SHIPPED | OUTPATIENT
Start: 2023-03-29 | End: 2023-04-01

## 2023-03-29 RX ORDER — LIDOCAINE HYDROCHLORIDE 10 MG/ML
0.3 INJECTION, SOLUTION EPIDURAL; INFILTRATION; INTRACAUDAL; PERINEURAL
Status: DISCONTINUED | OUTPATIENT
Start: 2023-03-29 | End: 2023-03-29 | Stop reason: HOSPADM

## 2023-03-29 RX ORDER — MAGNESIUM HYDROXIDE 1200 MG/15ML
LIQUID ORAL CONTINUOUS PRN
Status: COMPLETED | OUTPATIENT
Start: 2023-03-29 | End: 2023-03-29

## 2023-03-29 RX ORDER — DROPERIDOL 2.5 MG/ML
0.62 INJECTION, SOLUTION INTRAMUSCULAR; INTRAVENOUS
Status: DISCONTINUED | OUTPATIENT
Start: 2023-03-29 | End: 2023-03-29 | Stop reason: HOSPADM

## 2023-03-29 RX ORDER — MEPERIDINE HYDROCHLORIDE 50 MG/ML
12.5 INJECTION INTRAMUSCULAR; INTRAVENOUS; SUBCUTANEOUS EVERY 5 MIN PRN
Status: DISCONTINUED | OUTPATIENT
Start: 2023-03-29 | End: 2023-03-29 | Stop reason: HOSPADM

## 2023-03-29 RX ORDER — SODIUM CHLORIDE 0.9 % (FLUSH) 0.9 %
5-40 SYRINGE (ML) INJECTION PRN
Status: DISCONTINUED | OUTPATIENT
Start: 2023-03-29 | End: 2023-03-29 | Stop reason: HOSPADM

## 2023-03-29 RX ORDER — KETOROLAC TROMETHAMINE 30 MG/ML
15 INJECTION, SOLUTION INTRAMUSCULAR; INTRAVENOUS
Status: DISCONTINUED | OUTPATIENT
Start: 2023-03-29 | End: 2023-03-29 | Stop reason: HOSPADM

## 2023-03-29 RX ORDER — PROPOFOL 10 MG/ML
INJECTION, EMULSION INTRAVENOUS PRN
Status: DISCONTINUED | OUTPATIENT
Start: 2023-03-29 | End: 2023-03-29 | Stop reason: SDUPTHER

## 2023-03-29 RX ORDER — ONDANSETRON 2 MG/ML
INJECTION INTRAMUSCULAR; INTRAVENOUS PRN
Status: DISCONTINUED | OUTPATIENT
Start: 2023-03-29 | End: 2023-03-29 | Stop reason: SDUPTHER

## 2023-03-29 RX ORDER — METHYLPREDNISOLONE 4 MG/1
TABLET ORAL
Qty: 1 KIT | Refills: 0 | Status: SHIPPED | OUTPATIENT
Start: 2023-03-29 | End: 2023-04-04

## 2023-03-29 RX ORDER — SODIUM CHLORIDE, SODIUM LACTATE, POTASSIUM CHLORIDE, CALCIUM CHLORIDE 600; 310; 30; 20 MG/100ML; MG/100ML; MG/100ML; MG/100ML
INJECTION, SOLUTION INTRAVENOUS CONTINUOUS PRN
Status: DISCONTINUED | OUTPATIENT
Start: 2023-03-29 | End: 2023-03-29 | Stop reason: SDUPTHER

## 2023-03-29 RX ORDER — SODIUM CHLORIDE, SODIUM LACTATE, POTASSIUM CHLORIDE, CALCIUM CHLORIDE 600; 310; 30; 20 MG/100ML; MG/100ML; MG/100ML; MG/100ML
INJECTION, SOLUTION INTRAVENOUS CONTINUOUS
Status: DISCONTINUED | OUTPATIENT
Start: 2023-03-29 | End: 2023-03-29 | Stop reason: HOSPADM

## 2023-03-29 RX ORDER — LIDOCAINE HYDROCHLORIDE 20 MG/ML
INJECTION, SOLUTION INFILTRATION; PERINEURAL PRN
Status: DISCONTINUED | OUTPATIENT
Start: 2023-03-29 | End: 2023-03-29 | Stop reason: SDUPTHER

## 2023-03-29 RX ORDER — FENTANYL CITRATE 50 UG/ML
INJECTION, SOLUTION INTRAMUSCULAR; INTRAVENOUS PRN
Status: DISCONTINUED | OUTPATIENT
Start: 2023-03-29 | End: 2023-03-29 | Stop reason: SDUPTHER

## 2023-03-29 RX ORDER — ACETAMINOPHEN 500 MG
1000 TABLET ORAL
Status: DISCONTINUED | OUTPATIENT
Start: 2023-03-29 | End: 2023-03-29 | Stop reason: HOSPADM

## 2023-03-29 RX ADMIN — SUGAMMADEX 200 MG: 100 INJECTION, SOLUTION INTRAVENOUS at 10:37

## 2023-03-29 RX ADMIN — ONDANSETRON 4 MG: 2 INJECTION INTRAMUSCULAR; INTRAVENOUS at 09:58

## 2023-03-29 RX ADMIN — SODIUM CHLORIDE, SODIUM LACTATE, POTASSIUM CHLORIDE, AND CALCIUM CHLORIDE: .6; .31; .03; .02 INJECTION, SOLUTION INTRAVENOUS at 09:53

## 2023-03-29 RX ADMIN — Medication 50 MCG: at 10:26

## 2023-03-29 RX ADMIN — HYDROMORPHONE HYDROCHLORIDE 0.5 MG: 0.5 INJECTION, SOLUTION INTRAMUSCULAR; INTRAVENOUS; SUBCUTANEOUS at 11:17

## 2023-03-29 RX ADMIN — PROPOFOL 200 MG: 10 INJECTION, EMULSION INTRAVENOUS at 09:58

## 2023-03-29 RX ADMIN — LIDOCAINE HYDROCHLORIDE 100 MG: 20 INJECTION, SOLUTION INFILTRATION; PERINEURAL at 09:58

## 2023-03-29 RX ADMIN — MIDAZOLAM HYDROCHLORIDE 2 MG: 2 INJECTION, SOLUTION INTRAMUSCULAR; INTRAVENOUS at 09:54

## 2023-03-29 RX ADMIN — FENTANYL CITRATE 100 MCG: 50 INJECTION, SOLUTION INTRAMUSCULAR; INTRAVENOUS at 09:54

## 2023-03-29 RX ADMIN — DEXAMETHASONE SODIUM PHOSPHATE 10 MG: 4 INJECTION, SOLUTION INTRAMUSCULAR; INTRAVENOUS at 09:58

## 2023-03-29 RX ADMIN — ROCURONIUM BROMIDE 50 MG: 10 SOLUTION INTRAVENOUS at 09:58

## 2023-03-29 ASSESSMENT — ENCOUNTER SYMPTOMS: SHORTNESS OF BREATH: 0

## 2023-03-29 ASSESSMENT — PAIN - FUNCTIONAL ASSESSMENT: PAIN_FUNCTIONAL_ASSESSMENT: NONE - DENIES PAIN

## 2023-03-29 ASSESSMENT — PAIN SCALES - GENERAL: PAINLEVEL_OUTOF10: 8

## 2023-03-29 NOTE — H&P
The patient's most recent H&P, office notes, imaging, and pathology were reviewed. Patient examined. There has been no changes. We will plan to proceed with a BILATERAL INFERIOR TURBIATE REDUCTION AND SEPTOPLASTY.     [employer], DO

## 2023-03-29 NOTE — OP NOTE
[facility] [facility]  OPERATIVE REPORT      Patient: Yg Griffith  YOB: 1975  MRN: [id number]    Date of Procedure: 3/29/2023    Pre-Op Diagnosis: DEVIATED NASAL SEPTUM; INFERIOR TURBINATE HYPERTROPHY    Post-Op Diagnosis: Same       Procedure(s):  BILATERAL INFERIOR TURBIATE REDUCTION AND SEPTOPLASTY    Surgeon(s):  [employer], DO    OR Staff/ Assistant:  Circulator: Bal Frances RN; Luisito Davalos RN  Scrub Person First: Sofia Perez  Scrub Person Second: Shirley Riggins    Anesthesia: General    Estimated Blood Loss (mL): 20 mL    Complications: None    Specimens:   * No specimens in log *    Implants:  * No implants in log *      Drains: * No LDAs found *    Findings: 1) S shaped deviated septum to the left anteriorly and right posteriorly 2) inferior turbinate hypertrophy 3) 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 After general anesthesia was obtained, Afrin soaked pledgets were placed into both nasal cavities. Septoplasty (46442):      1% lidocaine with 1:100,000 epinephrine was injected along both sides of the nasal septum. A #15 blade was used to make a daryl-transfixtion incision in the left nare. A mucoperichondrial flap was developed on the left. An 15 blade was used to transcribe through the septal cartilage and a mucoperichondrial flap was developed on the right hand side. Deviated portions of septal cartilage and bone were removed with a combination of a swivel knife, double action rongeur and through cutting forceps. Inferior Turbinate Reduction (66116-85):   A #15 blade was used to make an incision in the bilateral heads of the inferior turbinated. The mucosa was elevated with a darya elevator. The microdebrider was then used to perform a submucous resection bilaterally.  [employer]

## 2023-03-29 NOTE — PROGRESS NOTES
Discharge: Pt discharged to home as per order. IV removed. Scripts plus instructions given. Pt verbalized understanding. Denied questions.
Patient arrived to PACU bay 5 , placed on bedside monitor. VSS. Report obtained from OR RN and anesthesia.   
Raleigh Hands    Age 41 y.o.    male    1975    MRN [id number]    3/29/2023  Arrival Time_____________  OR Time____________[address] [place]     Procedure(s):  BILATERAL INFERIOR TURBIATE REDUCTION AND SEPTOPLASTY                      General   Surgeon(s):  [employer], DO      DAY ADMIT ___  SDS/OP ___  OUTPT IN BED ___        Phone [phone] (home)     PCP _____________________ Phone_________________ Epic ( ) Epic CE ( ) Appt ________    ADDITIONAL INFO __________________________________ Cardio/Consult _____________    NOTES _____________________________________________________________________    ____________________________________________________________________________    PAT APPT DATE:________ TIME: ________  FAXED QAD: _______  (__) H&P w/ Hospitalist  __________________________________________________________________________  Preop Nurse phone screen complete: _____________  (__) CBC     (__) W/ DIFF ___________     (__) Hgb A1C    ___________  (__) CHEST X RAY   __________  (__) LIPID PROFILE  ___________  (__) EKG   __________  (__) PT/PTT   ___________  (__) PFT's   __________  (__) BMP   ___________  (__) CAROTIDS  __________  (__) CMP   ___________  (__) VEIN MAPPING  __________  (__) U/A   ___________  (__) HISTORY & PHYSICAL __________  (__) URINE C & S  ___________  (__) CARDIAC CLEARANCE __________  (__) U/A W/ FLEX  ___________  (__) PULM.  CLEARANCE __________  (__) SERUM PREGNANCY ___________  (__) Check Epic DOS orders __________  (__) TYPE & SCREEN __________repeat ( ) (__)  __________________ __________  (__) ALBUMIN   ___________  (__)  __________________ __________  (__) TRANSFERRIN  ___________  (__)  __________________ __________  (__) LIVER PROFILE  ___________  (__)  __________________ __________  (__) MRSA NASAL SWAB ___________  (__) URINE PREG DOS __________  (__) SED RATE  ___________  (__) BLOOD SUGAR DOS __________  (__) C-REACTIVE PROTEIN ___________    (__) VITAMIN D
they will be removed, please bring a case for them. 10. If appicable,Please see your family doctor/pediatrician for a history & physical and/or concerning medications. Bring any test results/reports from your physician's office. 11. Remember to bring Blood Bank bracelet to the hospital on the day of surgery. 12. If you have a Living Will and Durable Power of  for Healthcare, please bring in a copy. 15. Notify your Surgeon if you develop any illness between now and surgery  time, cough, cold, fever, sore throat, nausea, vomiting, etc.  Please notify your surgeon if you experience dizziness, shortness of breath or blurred vision between now & the time of your surgery   14. DO NOT shave your operative site 96 hours prior to surgery. For face & neck surgery, men may use an electric razor 48 hours prior to surgery. 15. Shower the night before surgery with _X__Antibacterial soap ___Hibiclens   16. To provide excellent care visitors will be limited to one in the room at any given time. 17.  Please bring picture ID and insurance card. 18.  Visit our web site for additional information:  [facility] [employer]/surgery.

## 2023-03-29 NOTE — ANESTHESIA POSTPROCEDURE EVALUATION
Department of Anesthesiology  Postprocedure Note    Patient: Natalie Jones  MRN: [id number]  YOB: 1975  Date of evaluation: 3/29/2023      Procedure Summary     Date: 03/29/23 Room / Location: [facility] [address] [place] [address]    Anesthesia Start: 8601 Anesthesia Stop: 1059    Procedure: BILATERAL INFERIOR TURBIATE REDUCTION AND SEPTOPLASTY (Nose) Diagnosis:       Deviated nasal septum      Hypertrophy of inferior nasal turbinate      (DEVIATED NASAL SEPTUM; INFERIOR TURBINATE HYPERTROPHY)    Surgeons: Zeb Banda DO Responsible Provider: Doroteo Garvey MD    Anesthesia Type: general ASA Status: 1          Anesthesia Type: No value filed.     July Phase I: July Score: 9    July Phase II: July Score: 10      Anesthesia Post Evaluation    Patient location during evaluation: PACU  Patient participation: complete - patient participated  Level of consciousness: awake and alert  Airway patency: patent  Nausea & Vomiting: no nausea and no vomiting  Complications: no  Cardiovascular status: hemodynamically stable  Respiratory status: acceptable, room air and spontaneous ventilation  Hydration status: stable  Comments: --------------------            03/29/23               1120     --------------------   BP:       102/71     Pulse:      57       Resp:                Temp:                SpO2:      95%      --------------------

## 2023-03-29 NOTE — DISCHARGE INSTRUCTIONS
SEPTOPLASTY / [facility] Operative Instructions   [facility] ENT Number (24 hours): [phone]    The Surgery Itself   Septoplasty and turbinate reduction involves general anesthesia, typically for less than one hour. Patients may be sedated for several hours after surgery and may remain sleepy for the better part of the day. Nausea and vomiting is occasionally seen, and usually resolves by the evening of surgery - even without additional medications. Almost all patients can go home the day of surgery. After Surgery  You may have plastic splints and/or gauze in your nose following surgery; this will make breathing through your nose difficult. A humidifier or vaporizer can be used in the bedroom to prevent throat pain with mouth breathing. Bloody nasal drainage is normal after this surgery for 5-7 days, usually decreasing in volume with each day that passes. Drainage will flow from the front of the nose and down the back of the throat. Make sure you spit out blood drainage that drips down the back of your throat to prevent nausea/vomiting. You will have a nasal drip pad/sling with gauze to catch drainage from the front of your nose. The dressing may need to be changed frequently during the first 24 hours following surgery. In case of profuse nasal bleeding, you may apply ice to the bridge of the nose and pinch the nose just above the tip and hold for 10 minutes; if profuse bleeding continues, contact the doctor's office. You may notice facial pressure and fullness similar to a mild sinus infection. This is more common if splints or gauze are in place. Frequent hot showers, breathing in steam from a pot of boiling water, or simply sniffing a small amount of water through your nose will help break up congestion and clear any clot or mucus that builds up within the nose after surgery. Do not pull at the splints, gauze or sutures in your nose after surgery.     It is more comfortable to sleep

## 2023-03-29 NOTE — ANESTHESIA PRE PROCEDURE
Department of Anesthesiology  Preprocedure Note       Name:  Tenzin Lopez   Age:  41 y.o.  :  1975                                          MRN:  [id number]         Date:  3/29/2023      Surgeon: Yvonne Marcus):  Nain Powers DO    Procedure: Procedure(s):  BILATERAL INFERIOR TURBIATE REDUCTION AND SEPTOPLASTY    Medications prior to admission:   Prior to Admission medications    Medication Sig Start Date End Date Taking? Authorizing Provider   escitalopram (LEXAPRO) 5 MG tablet Take 20 mg by mouth daily    Historical Provider, MD   pantoprazole (PROTONIX) 40 MG tablet Take 40 mg by mouth nightly    Historical Provider, MD   loratadine (CLARITIN) 10 MG tablet Take 10 mg by mouth daily. Historical Provider, MD       Current medications:    Current Facility-Administered Medications   Medication Dose Route Frequency Provider Last Rate Last Admin    lidocaine PF 1 % injection 0.3 mL  0.3 mL IntraDERmal Once PRN Renetta Mclaughlin MD        lactated ringers IV soln infusion   IntraVENous Continuous Renetta Mclaughlin MD        sodium chloride flush 0.9 % injection 5-40 mL  5-40 mL IntraVENous 2 times per day Renetta Mclaughlin MD        sodium chloride flush 0.9 % injection 5-40 mL  5-40 mL IntraVENous PRN Renetta Mclaughlin MD        0.9 % sodium chloride infusion   IntraVENous PRN Renetta Mclaughlin MD           Allergies:  No Known Allergies    Problem List:  There is no problem list on file for this patient. Past Medical History:        Diagnosis Date    Allergic rhinitis     Deviated septum     Gastric ulcer        Past Surgical History:        Procedure Laterality Date    ABDOMEN SURGERY      secondary to gastric ulcer    HEEL SPUR SURGERY      TONSILLECTOMY         Social History:    Social History     Tobacco Use    Smoking status: Never     Passive exposure:  Yes    Smokeless tobacco: Never   Substance Use Topics    Alcohol use: Not Currently     Comment: socially

## 2023-04-05 NOTE — PROGRESS NOTES
[facility] - ENT  PROGRESS  NOTE    Date of Service: 4/6/2023    ASSESSMENT/PLAN  Denice Escobedo is a very pleasant 41 y.o. male with s/p septoplasty and inferior turbinate reduction on 3/29/2023    1. Deviated nasal septum  2. Seasonal allergies  Denice Escobedo is healing well. He will begin using Flonase in the postoperative period. He will follow-up with me in 1 month.       [employer], DO

## 2023-04-06 ENCOUNTER — OFFICE VISIT (OUTPATIENT)
Dept: ENT CLINIC | Age: 48
End: 2023-04-06

## 2023-04-06 VITALS
HEART RATE: 66 BPM | RESPIRATION RATE: 16 BRPM | WEIGHT: 196 LBS | DIASTOLIC BLOOD PRESSURE: 77 MMHG | HEIGHT: 71 IN | SYSTOLIC BLOOD PRESSURE: 113 MMHG | BODY MASS INDEX: 27.44 KG/M2 | TEMPERATURE: 97.5 F

## 2023-04-06 DIAGNOSIS — J34.2 DEVIATED NASAL SEPTUM: Primary | ICD-10-CM

## 2023-04-06 DIAGNOSIS — J30.2 SEASONAL ALLERGIES: ICD-10-CM

## 2023-04-06 PROCEDURE — 99024 POSTOP FOLLOW-UP VISIT: CPT | Performed by: STUDENT IN AN ORGANIZED HEALTH CARE EDUCATION/TRAINING PROGRAM

## 2023-04-06 RX ORDER — FLUTICASONE PROPIONATE 50 MCG
2 SPRAY, SUSPENSION (ML) NASAL DAILY
Qty: 48 G | Refills: 1 | Status: SHIPPED | OUTPATIENT
Start: 2023-04-06

## 2023-05-17 NOTE — PROGRESS NOTES
[facility] - ENT  PROGRESS  NOTE    Date of Service: 5/19/2023    ASSESSMENT/PLAN  Edenilson Beltran is a very pleasant 41 y.o. male with s/p septoplasty and inferior turbinate reduction on 3/29/2023    1. Deviated nasal septum  2. Seasonal allergies  He is using his Claritin but not Flonase. He has no complaints. He will follow-up as needed.     [employer], DO

## 2023-05-19 ENCOUNTER — OFFICE VISIT (OUTPATIENT)
Dept: ENT CLINIC | Age: 48
End: 2023-05-19

## 2023-05-19 VITALS — WEIGHT: 196 LBS | TEMPERATURE: 97.9 F | HEIGHT: 71 IN | BODY MASS INDEX: 27.44 KG/M2 | RESPIRATION RATE: 16 BRPM

## 2023-05-19 DIAGNOSIS — J34.2 DEVIATED NASAL SEPTUM: Primary | ICD-10-CM

## 2023-05-19 DIAGNOSIS — J30.2 SEASONAL ALLERGIES: ICD-10-CM

## 2023-05-19 PROCEDURE — 99024 POSTOP FOLLOW-UP VISIT: CPT | Performed by: STUDENT IN AN ORGANIZED HEALTH CARE EDUCATION/TRAINING PROGRAM

## 2023-12-16 ENCOUNTER — HOSPITAL ENCOUNTER (OUTPATIENT)
Dept: ULTRASOUND IMAGING | Age: 48
Discharge: HOME | End: 2023-12-16
Attending: INTERNAL MEDICINE
Payer: COMMERCIAL

## 2023-12-16 DIAGNOSIS — R74.8 ABNORMAL LIVER ENZYMES: ICD-10-CM

## 2023-12-16 PROCEDURE — 76705 ECHO EXAM OF ABDOMEN: CPT
